# Patient Record
Sex: FEMALE | Race: WHITE | HISPANIC OR LATINO
[De-identification: names, ages, dates, MRNs, and addresses within clinical notes are randomized per-mention and may not be internally consistent; named-entity substitution may affect disease eponyms.]

---

## 2021-03-10 ENCOUNTER — APPOINTMENT (OUTPATIENT)
Dept: DISASTER EMERGENCY | Facility: CLINIC | Age: 71
End: 2021-03-10

## 2021-03-11 LAB — SARS-COV-2 N GENE NPH QL NAA+PROBE: NOT DETECTED

## 2021-11-05 DIAGNOSIS — Z01.818 ENCOUNTER FOR OTHER PREPROCEDURAL EXAMINATION: ICD-10-CM

## 2021-11-06 ENCOUNTER — APPOINTMENT (OUTPATIENT)
Dept: DISASTER EMERGENCY | Facility: CLINIC | Age: 71
End: 2021-11-06

## 2021-11-08 ENCOUNTER — APPOINTMENT (OUTPATIENT)
Dept: DISASTER EMERGENCY | Facility: CLINIC | Age: 71
End: 2021-11-08

## 2021-11-09 LAB — SARS-COV-2 N GENE NPH QL NAA+PROBE: NOT DETECTED

## 2021-11-11 ENCOUNTER — OUTPATIENT (OUTPATIENT)
Dept: OUTPATIENT SERVICES | Facility: HOSPITAL | Age: 71
LOS: 1 days | End: 2021-11-11
Payer: MEDICARE

## 2021-11-11 DIAGNOSIS — Z00.00 ENCOUNTER FOR GENERAL ADULT MEDICAL EXAMINATION WITHOUT ABNORMAL FINDINGS: ICD-10-CM

## 2021-11-11 PROCEDURE — 78264 GASTRIC EMPTYING IMG STUDY: CPT | Mod: 26,GC,MH

## 2021-11-11 PROCEDURE — A9541: CPT

## 2021-11-11 PROCEDURE — 78264 GASTRIC EMPTYING IMG STUDY: CPT | Mod: MH

## 2021-11-11 PROCEDURE — 82962 GLUCOSE BLOOD TEST: CPT

## 2022-01-10 ENCOUNTER — RESULT REVIEW (OUTPATIENT)
Age: 72
End: 2022-01-10

## 2022-04-04 ENCOUNTER — RESULT REVIEW (OUTPATIENT)
Age: 72
End: 2022-04-04

## 2022-04-11 ENCOUNTER — RESULT REVIEW (OUTPATIENT)
Age: 72
End: 2022-04-11

## 2022-04-11 ENCOUNTER — OUTPATIENT (OUTPATIENT)
Dept: OUTPATIENT SERVICES | Facility: HOSPITAL | Age: 72
LOS: 1 days | End: 2022-04-11
Payer: MEDICARE

## 2022-04-11 ENCOUNTER — TRANSCRIPTION ENCOUNTER (OUTPATIENT)
Age: 72
End: 2022-04-11

## 2022-04-11 VITALS
DIASTOLIC BLOOD PRESSURE: 62 MMHG | WEIGHT: 153 LBS | HEIGHT: 67 IN | HEART RATE: 70 BPM | SYSTOLIC BLOOD PRESSURE: 114 MMHG | RESPIRATION RATE: 16 BRPM | TEMPERATURE: 97 F

## 2022-04-11 VITALS
OXYGEN SATURATION: 99 % | RESPIRATION RATE: 19 BRPM | DIASTOLIC BLOOD PRESSURE: 75 MMHG | SYSTOLIC BLOOD PRESSURE: 115 MMHG | HEART RATE: 65 BPM

## 2022-04-11 DIAGNOSIS — D13.39 BENIGN NEOPLASM OF OTHER PARTS OF SMALL INTESTINE: ICD-10-CM

## 2022-04-11 DIAGNOSIS — K56.609 UNSPECIFIED INTESTINAL OBSTRUCTION, UNSPECIFIED AS TO PARTIAL VERSUS COMPLETE OBSTRUCTION: Chronic | ICD-10-CM

## 2022-04-11 DIAGNOSIS — Z90.49 ACQUIRED ABSENCE OF OTHER SPECIFIED PARTS OF DIGESTIVE TRACT: Chronic | ICD-10-CM

## 2022-04-11 PROCEDURE — 88305 TISSUE EXAM BY PATHOLOGIST: CPT

## 2022-04-11 PROCEDURE — 88305 TISSUE EXAM BY PATHOLOGIST: CPT | Mod: 26

## 2022-04-11 PROCEDURE — 43239 EGD BIOPSY SINGLE/MULTIPLE: CPT

## 2022-04-11 PROCEDURE — 43259 EGD US EXAM DUODENUM/JEJUNUM: CPT

## 2022-04-11 RX ORDER — GALCANEZUMAB 100 MG/ML
0 INJECTION, SOLUTION SUBCUTANEOUS
Qty: 0 | Refills: 0 | DISCHARGE

## 2022-04-11 RX ORDER — FAMOTIDINE 10 MG/ML
1 INJECTION INTRAVENOUS
Qty: 0 | Refills: 0 | DISCHARGE

## 2022-04-11 RX ORDER — SUMATRIPTAN SUCCINATE 4 MG/.5ML
1 INJECTION, SOLUTION SUBCUTANEOUS
Qty: 0 | Refills: 0 | DISCHARGE

## 2022-04-11 RX ORDER — SODIUM CHLORIDE 9 MG/ML
500 INJECTION INTRAMUSCULAR; INTRAVENOUS; SUBCUTANEOUS
Refills: 0 | Status: DISCONTINUED | OUTPATIENT
Start: 2022-04-11 | End: 2022-04-25

## 2022-04-11 NOTE — PRE PROCEDURE NOTE - PRE PROCEDURE EVALUATION
Attending Physician:     Naveed Rodrigez MD                       Procedure: EUS    Indication for Procedure: Duodenal mass  ________________________________________________________  PAST MEDICAL & SURGICAL HISTORY:  GERD (gastroesophageal reflux disease)    Migraine-cluster headache syndrome    S/P appendectomy    SBO (small bowel obstruction)      ALLERGIES:  No Known Allergies    HOME MEDICATIONS:  Emgality Prefilled Pen 120 mg/mL subcutaneous solution:   Paxil 20 mg oral tablet: 1 tab(s) orally once a day  Pepcid 40 mg oral tablet: 1 tab(s) orally once a day (at bedtime)  SUMAtriptan 100 mg oral tablet: 1 tab(s) orally once, As Needed    AICD/PPM: [ ] yes   [ x] no    PERTINENT LAB DATA:                      PHYSICAL EXAMINATION:    Height (cm): 170.2  Weight (kg): 69.4  BMI (kg/m2): 24  BSA (m2): 1.8T(C): 36.2  HR: 70  BP: 114/62  RR: 16  SpO2: --    Constitutional: NAD  HEENT: PERRLA, EOMI,    Neck:  No JVD  Respiratory: CTAB/L  Cardiovascular: S1 and S2  Gastrointestinal: BS+, soft, NT/ND  Extremities: No peripheral edema  Neurological: A/O x 3, no focal deficits  Psychiatric: Normal mood, normal affect  Skin: No rashes    ASA Class: I [ ]  II [ x]  III [ ]  IV [ ]    COMMENTS:    The patient is a suitable candidate for the planned procedure unless box checked [ ]  No, explain:

## 2022-04-11 NOTE — ASU DISCHARGE PLAN (ADULT/PEDIATRIC) - NS MD DC FALL RISK RISK
For information on Fall & Injury Prevention, visit: [email or web address] OR  [email or web address] OR  https://www.cdc.gov/steadi/patient.html

## 2022-04-11 NOTE — ASU PATIENT PROFILE, ADULT - NSICDXPASTMEDICALHX_GEN_ALL_CORE_FT
PAST MEDICAL HISTORY:  GERD (gastroesophageal reflux disease)     Migraine-cluster headache syndrome

## 2022-04-12 LAB — SURGICAL PATHOLOGY STUDY: SIGNIFICANT CHANGE UP

## 2022-05-16 ENCOUNTER — APPOINTMENT (OUTPATIENT)
Dept: CT IMAGING | Facility: CLINIC | Age: 72
End: 2022-05-16
Payer: MEDICARE

## 2022-05-16 PROBLEM — K21.9 GASTRO-ESOPHAGEAL REFLUX DISEASE WITHOUT ESOPHAGITIS: Chronic | Status: ACTIVE | Noted: 2022-04-11

## 2022-05-16 PROBLEM — G44.009 CLUSTER HEADACHE SYNDROME, UNSPECIFIED, NOT INTRACTABLE: Chronic | Status: ACTIVE | Noted: 2022-04-11

## 2022-05-16 PROCEDURE — 74177 CT ABD & PELVIS W/CONTRAST: CPT | Mod: MH

## 2025-07-02 ENCOUNTER — APPOINTMENT (OUTPATIENT)
Dept: SURGERY | Facility: CLINIC | Age: 75
End: 2025-07-02
Payer: SELF-PAY

## 2025-07-02 ENCOUNTER — NON-APPOINTMENT (OUTPATIENT)
Age: 75
End: 2025-07-02

## 2025-07-02 ENCOUNTER — TRANSCRIPTION ENCOUNTER (OUTPATIENT)
Age: 75
End: 2025-07-02

## 2025-07-02 VITALS
OXYGEN SATURATION: 97 % | HEART RATE: 76 BPM | SYSTOLIC BLOOD PRESSURE: 136 MMHG | DIASTOLIC BLOOD PRESSURE: 87 MMHG | HEIGHT: 67 IN | BODY MASS INDEX: 26.53 KG/M2 | WEIGHT: 169 LBS | TEMPERATURE: 98.5 F

## 2025-07-02 PROCEDURE — 97802 MEDICAL NUTRITION INDIV IN: CPT

## 2025-08-06 ENCOUNTER — APPOINTMENT (OUTPATIENT)
Dept: SURGERY | Facility: CLINIC | Age: 75
End: 2025-08-06

## (undated) DEVICE — SYR ALLIANCE II INFLATION 60ML

## (undated) DEVICE — FORCEP RADIAL JAW 4 240CM DISP

## (undated) DEVICE — CATH IV SAFE BC 20G X 1.16" (PINK)

## (undated) DEVICE — PACK IV START WITH CHG

## (undated) DEVICE — BIOPSY FORCEP RADIAL JAW 4 STANDARD WITH NEEDLE

## (undated) DEVICE — IRRIGATOR BIO SHIELD

## (undated) DEVICE — SYR LUER LOK 10CC

## (undated) DEVICE — SENSOR O2 FINGER ADULT 24/CA

## (undated) DEVICE — TUBING IV SET GRAVITY 3Y 100" MACRO

## (undated) DEVICE — CATH BLLN ULTRASONIC ENSOSCOPE

## (undated) DEVICE — SOL INJ NS 0.9% 500ML 2 PORT

## (undated) DEVICE — FOLEY HOLDER STATLOCK 2 WAY ADULT

## (undated) DEVICE — TUBING SUCTION CONN 6FT STERILE

## (undated) DEVICE — TUBING SUCTION 20FT

## (undated) DEVICE — CATH IV SAFE BC 22G X 1" (BLUE)

## (undated) DEVICE — TUBE VENOUS BLOOD COLLECTION LIGHT GREEN TOP

## (undated) DEVICE — BALLOON US ENDO

## (undated) DEVICE — FORCEP RADIAL JAW 4 JUMBO 2.8MM 3.2MM 240CM ORANGE DISP

## (undated) DEVICE — BITE BLOCK ADULT 20 X 27MM (GREEN)

## (undated) DEVICE — FORCEP RADIAL JAW 4 W NDL 2.4MM 2.8MM 240CM ORANGE DISP

## (undated) DEVICE — SYR IV FLUSH SALINE 10ML 30/TY

## (undated) DEVICE — SUCTION YANKAUER NO CONTROL VENT